# Patient Record
Sex: FEMALE | Race: AMERICAN INDIAN OR ALASKA NATIVE
[De-identification: names, ages, dates, MRNs, and addresses within clinical notes are randomized per-mention and may not be internally consistent; named-entity substitution may affect disease eponyms.]

---

## 2018-01-11 ENCOUNTER — HOSPITAL ENCOUNTER (INPATIENT)
Dept: HOSPITAL 74 - JLDR | Age: 38
LOS: 2 days | Discharge: HOME | DRG: 560 | End: 2018-01-13
Attending: OBSTETRICS & GYNECOLOGY | Admitting: OBSTETRICS & GYNECOLOGY
Payer: COMMERCIAL

## 2018-01-11 VITALS — BODY MASS INDEX: 27.1 KG/M2

## 2018-01-11 DIAGNOSIS — Z3A.38: ICD-10-CM

## 2018-01-11 LAB
ANION GAP SERPL CALC-SCNC: 12 MMOL/L (ref 8–16)
BASOPHILS # BLD: 0.5 % (ref 0–2)
BUN SERPL-MCNC: 10 MG/DL (ref 7–18)
CALCIUM SERPL-MCNC: 8.6 MG/DL (ref 8.5–10.1)
CHLORIDE SERPL-SCNC: 104 MMOL/L (ref 98–107)
CO2 SERPL-SCNC: 20 MMOL/L (ref 21–32)
CREAT SERPL-MCNC: 0.5 MG/DL (ref 0.55–1.02)
DEPRECATED RDW RBC AUTO: 14 % (ref 11.6–15.6)
EOSINOPHIL # BLD: 1.3 % (ref 0–4.5)
GLUCOSE SERPL-MCNC: 81 MG/DL (ref 74–106)
HCT VFR BLD CALC: 37.9 % (ref 32.4–45.2)
HGB BLD-MCNC: 12.1 GM/DL (ref 10.7–15.3)
INR BLD: 0.87 (ref 0.82–1.09)
LYMPHOCYTES # BLD: 20.1 % (ref 8–40)
MCH RBC QN AUTO: 25.9 PG (ref 25.7–33.7)
MCHC RBC AUTO-ENTMCNC: 32 G/DL (ref 32–36)
MCV RBC: 81.1 FL (ref 80–96)
MONOCYTES # BLD AUTO: 5.2 % (ref 3.8–10.2)
NEUTROPHILS # BLD: 72.9 % (ref 42.8–82.8)
PLATELET # BLD AUTO: 250 K/MM3 (ref 134–434)
PMV BLD: 9.9 FL (ref 7.5–11.1)
POTASSIUM SERPLBLD-SCNC: 4 MMOL/L (ref 3.5–5.1)
PT PNL PPP: 9.8 SEC (ref 9.98–11.88)
RBC # BLD AUTO: 4.68 M/MM3 (ref 3.6–5.2)
SODIUM SERPL-SCNC: 136 MMOL/L (ref 136–145)
WBC # BLD AUTO: 9.1 K/MM3 (ref 4–10)

## 2018-01-11 RX ADMIN — FERROUS SULFATE TAB EC 324 MG (65 MG FE EQUIVALENT) SCH: 324 (65 FE) TABLET DELAYED RESPONSE at 17:34

## 2018-01-11 NOTE — PN
Delivery





- Delivery


Vaginal Delivery: No Problems, Spontaneous


Type of Anesthesia: Local, Epidural


Episiotomy/Laceration: Midline (episiotomy sutured in layers with chr catgut #2/

0 under 1% lidocaine infiltration . pr exam mucosa & sphincter intact)


EBL (cc): 350





Delivery, Single Birth





- Stages of Labor


Date 1st Stage Initiatied: 18


Time 1st Stage Initiated: 11:30


Date 2nd Stage Initiated: 18


Time 2nd Stage Initiated: 16:00


Date of Delivery: 18


Time of Delivery: 16:22


Date Placenta Delivered: 18


Time Placenta Delivered: 16:25


Placenta: Yes: Spontaneous, Uterine Exploration





- Condition of Infant


Pediatrician/Neonatologist Present: No


Infant Gender: Male


Birth Weight: 7 lb 4 oz


Position: Left, OA


Total Hours ROM (Hrs/Mins): 11 hrs 55min





- Apgar


  ** 1 Minute


Apgar Total Score: 9





  ** 5 Minutes


Apgar Total Score: 9





-  Feeding Plan


Initial Plan: Elected not to breastfeed exclusively throughout hospitalization





Remarks





- Remarks


Remarks: 


37 yrs ( AMA), , 38.5 weeks SROM , admitted in labor 


PNC at [facility] 


GBS neg 


Pitocin augmentation was given . 


Intrapartum course was normal

## 2018-01-11 NOTE — PN
Progress Note, Labor


  ** Vaginal Exam #1


Labor Exam Date: 18


Labor Exam Time: 10:25


Fetal Heart Rate (range): 130


Dilatation: 3-4


Effacement (%): 65


Amniotic Membrane Status: Ruptured


Fetal Presentation: Vertex/Position


Fetal Station: -2 (-3/-2)


Remarks: 


FHR cat-1


UC q3-5 min irregular, mild 


Pitocin 1 ml/hr, augmentation started at 9.10 AM 


Patient admitted by Dr Shannon , labor management transferred to Ob on call 

Dr Nelson 


37 Yrs ( AMA) , 38.5/7 weeks, s/o SROM since 4.30 AM 1/10/18 .


[facility] . [facility] reviewed .


Genetic counselling & NIPT , Anatomy sono done at [facility] [place] .


Pap Ascus , Hpv neg, Gbs neg , PPD pos , h/o Anemia  in mid trimester, 

corrected by po Iron &  pnv 


 Selected Entries











  18





  08:00 09:00


 


Temperature 97.7 F 


 


Pulse Rate 74 72


 


Blood Pressure 120/77 114/80








 Laboratory Tests











  18





  06:50 06:50 06:50


 


WBC   9.1 


 


Hgb   12.1 


 


Hct   37.9 


 


Plt Count   250 


 


Neutrophils %   72.9 


 


Lymphocytes %   20.1 


 


PT with INR  9.80 L  


 


INR  0.87  


 


PTT (Actin FS)   


 


Sodium    136


 


Potassium    4.0


 


Chloride    104


 


Carbon Dioxide    20 L


 


BUN    10


 


Creatinine    0.5 L


 


Random Glucose    81


 


Blood Type   


 


Antibody Screen   














  18





  06:50 06:50 06:50


 


WBC   


 


Hgb   


 


Hct   


 


Plt Count   


 


Neutrophils %   


 


Lymphocytes %   


 


PT with INR   


 


INR   


 


PTT (Actin FS)   28.4 


 


Sodium   


 


Potassium   


 


Chloride   


 


Carbon Dioxide   


 


BUN   


 


Creatinine   


 


Random Glucose   


 


Blood Type    B POSITIVE


 


Antibody Screen  Negative  














  ** Vaginal Exam #2


Labor Exam Date: 18


Labor Exam Time: 12:45


Fetal Heart Rate (range): 130


Dilatation: 6


Effacement (%): 90


Amniotic Membrane Status: Ruptured


Fetal Presentation: Vertex/Position


Fetal Station: +1


Remarks: 


FHR cat-1


UC 3-4 min 


pitocin 4ml//hr 


epidural analgesia is given at 12.00 Noon 


Temp 98.7


 Selected Entries











  18





  12:30


 


Pulse Rate 97 H


 


Blood Pressure 115/77














  ** Vaginal Exam #3


Labor Exam Date: 18


Labor Exam Time: 13:45


Fetal Heart Rate (range): 120-130


Dilatation: 8


Effacement (%): 90


Amniotic Membrane Status: Ruptured


Fetal Presentation: Vertex/Position


Fetal Station: +2 (caput, ant lip edematous)


Remarks: 


fhr cat-1


uc 3-4 min 


pt c/o pressure pain 


epidural top off given 








  ** Vaginal Exam #4


Labor Exam Date: 18


Labor Exam Time: 16:00


Fetal Heart Rate (range): 120-110


Dilatation: 10


Effacement (%): 100


Amniotic Membrane Status: Ruptured


Fetal Station: +2 (+2/+3)


Remarks: 


FHR cat-2


uc 2-3 min 


pt pushing

## 2018-01-11 NOTE — HP
Past Medical History





- Admission


Chief Complaint: Rupture of membrane


History of Present Illness: 





38 yo  @ 38.5 weeks gestation, EDC 18 presents c/o rupture of 

membrane.


She c/o mild contractions pain. Upon admission she was 4cm dilated.


History Source: Patient


Limitations to Obtaining History: No Limitations





- Past Medical History


...: 2


...Para: 0


...Term: 0


...: 0


...Spon : 0


...Induced : 1


...Multiple Gestation: 0


...LMP: 17


...EDC by Sono: 18





- Past Surgical History


Past Surgical History: Yes: None


Hx Myomectomy: No


Hx Transabdominal Cerclage: No





- Smoking History


Smoking history: Never smoked


Have you smoked in the past 12 months: No





- Alcohol/Substance Use


Hx Alcohol Use: No


History of Substance Use: reports: None





- Social History


Usual Living Arrangement: Yes: With Spouse


History of Recent Travel: No





Home Medications





- Allergies


Allergies/Adverse Reactions: 


 Allergies











Allergy/AdvReac Type Severity Reaction Status Date / Time


 


No Known Drug Allergies Allergy   Verified 18 06:28














- Home Medications


Home Medications: 


Ambulatory Orders





Prenatal Vit/Iron Fum/Folic AC [Prenatal Tablet] 1 tab PO DAILY 18 











Family Disease History





- Family Disease History


Family History: Unremarkable





Review of Systems





- Review of Systems


Constitutional: reports: No Symptoms


Eyes: reports: No Symptoms


HENT: reports: No Symptoms


Neck: reports: No Symptoms


Cardiovascular: reports: No Symptoms


Respiratory: reports: No Symptoms


Gastrointestinal: reports: No Symptoms


Genitourinary: reports: No Symptoms, Other (Leakage of amniotic fluid)


Breasts: reports: No Symptoms Reported


Musculoskeletal: reports: No Symptoms


Integumentary: reports: No Symptoms


Neurological: reports: No Symptoms


Endocrine: reports: No Symptoms


Hematology/Lymphatic: reports: No Symptoms


Psychiatric: reports: No Symptoms


Pain Intensity: 3





Physical Exam - Maternity


Vital Signs: 


 Vital Signs











Temperature  97.7 F   18 08:00


 


Pulse Rate  74   18 08:00


 


Respiratory Rate  18   18 08:00


 


Blood Pressure  120/77   18 08:00


 


O2 Sat by Pulse Oximetry (%)      











Constitutional: Yes: Well Nourished


Eyes: Yes: Conjunctiva Clear


HENT: Yes: Atraumatic


Neck: Yes: Supple


Cardiovascular: Yes: Regular Rate and Rhythm


Lungs: Clear to auscultation


Breast(s): Yes: WNL





- Abdominal Exam/OB


Number of Fetuses: Single


Fetal Presentation: Vertex





- Vaginal Exam/OB


Dilatation (cm): 4


Effacement (%): 80


Amniotic Membrane Status: Ruptured


Amniotic Fluid: Yes: Clear


Meconium: Light


Fetal Station: -2





- Physical Exam


Musculoskeletal: Yes: WNL


Extremities: Yes: WNL


...Motor Strength: WNL


Psychiatric: Yes: Alert, Oriented





- Labs


Lab Results: 


 CBC, BMP





 18 06:50 











Problem List





- Problems


(1) Spontaneous rupture of membranes


Code(s): RED1940 -    





Assessment/Plan





IUP @ 38 weeks


SROM


Admit to L&D


Analgesia as needed


Pitocin augmentation

## 2018-01-12 VITALS — TEMPERATURE: 97.8 F

## 2018-01-12 LAB
BASOPHILS # BLD: 0.2 % (ref 0–2)
DEPRECATED RDW RBC AUTO: 13.5 % (ref 11.6–15.6)
EOSINOPHIL # BLD: 0.5 % (ref 0–4.5)
HCT VFR BLD CALC: 30.5 % (ref 32.4–45.2)
HGB BLD-MCNC: 9.7 GM/DL (ref 10.7–15.3)
LYMPHOCYTES # BLD: 13.3 % (ref 8–40)
MCH RBC QN AUTO: 25.8 PG (ref 25.7–33.7)
MCHC RBC AUTO-ENTMCNC: 31.9 G/DL (ref 32–36)
MCV RBC: 80.9 FL (ref 80–96)
MONOCYTES # BLD AUTO: 4.8 % (ref 3.8–10.2)
NEUTROPHILS # BLD: 81.2 % (ref 42.8–82.8)
PLATELET # BLD AUTO: 192 K/MM3 (ref 134–434)
PMV BLD: 9.5 FL (ref 7.5–11.1)
RBC # BLD AUTO: 3.77 M/MM3 (ref 3.6–5.2)
WBC # BLD AUTO: 12.6 K/MM3 (ref 4–10)

## 2018-01-12 RX ADMIN — FERROUS SULFATE TAB EC 324 MG (65 MG FE EQUIVALENT) SCH MG: 324 (65 FE) TABLET DELAYED RESPONSE at 07:43

## 2018-01-12 RX ADMIN — Medication SCH TAB: at 09:28

## 2018-01-12 RX ADMIN — FERROUS SULFATE TAB EC 324 MG (65 MG FE EQUIVALENT) SCH MG: 324 (65 FE) TABLET DELAYED RESPONSE at 17:15

## 2018-01-12 RX ADMIN — IBUPROFEN PRN MG: 600 TABLET, FILM COATED ORAL at 07:44

## 2018-01-12 NOTE — PN
Progress Note (short form)





- Note


Progress Note: 





ppd 1 doing well, no c/o , voids ok


 CBC, BMP





 01/12/18 06:45 





 01/11/18 06:50 





 Last Vital Signs











Temp Pulse Resp BP Pulse Ox


 


 98.6 F   88   20   95/59   98 


 


 01/12/18 06:00  01/12/18 06:00  01/12/18 06:00  01/12/18 06:00  01/11/18 16:00








abdomen soft, uterus firm, non tender 


lochia mild


no calf tenderness 


plan ambualte,, iron, vit

## 2018-01-13 VITALS — DIASTOLIC BLOOD PRESSURE: 66 MMHG | SYSTOLIC BLOOD PRESSURE: 98 MMHG | HEART RATE: 69 BPM

## 2018-01-13 RX ADMIN — IBUPROFEN PRN MG: 600 TABLET, FILM COATED ORAL at 01:33

## 2018-01-13 RX ADMIN — FERROUS SULFATE TAB EC 324 MG (65 MG FE EQUIVALENT) SCH MG: 324 (65 FE) TABLET DELAYED RESPONSE at 08:03

## 2018-01-13 RX ADMIN — Medication SCH TAB: at 09:55

## 2018-01-13 NOTE — PN
Progress Note (short form)





- Note


Progress Note: 





ppd 2 ,no c/o no excess vaginal bleeding , voids ok


 CBC, BMP





 01/12/18 06:45 





 01/11/18 06:50 





 Last Vital Signs











Temp Pulse Resp BP Pulse Ox


 


 97.8 F   73   18   103/70   98 


 


 01/12/18 22:00  01/12/18 22:00  01/12/18 22:00  01/12/18 22:00  01/11/18 16:00








uterus firm, non tender 


perineum clean 


no calf tenderness 


plan D?C home, rt 4 weeks

## 2018-01-16 NOTE — DS
Physical Exam-GYN


Vital Signs: 


 Vital Signs











Temperature  97.8 F   18 09:00


 


Pulse Rate  69   18 09:00


 


Respiratory Rate  20   18 09:00


 


Blood Pressure  98/66   18 09:00


 


O2 Sat by Pulse Oximetry (%)  98   18 16:00











Constitutional: Yes: Well Nourished, Pallor


HENT: Yes: WNL


Neck: Yes: WNL


Cardiovascular: Yes: WNL


Respiratory: Yes: WNL


Gastrointestinal: Yes: WNL


...Rectal Exam: Yes: WNL


Renal/: Yes: WNL


....Post Partum: Yes: Uterus firm, Uterus non-tender, Moderate lochia rubra (

episiotomy healing. perineal soreness felt)


Breast(s): Yes: WNL (bf)


Musculoskeletal: Yes: WNL


Extremities: Yes: WNL.  No: Calf Tenderness


Edema: Yes


Edema: LLE: 1+, RLE: 1+


Neurological: Yes: WNL, Alert, Oriented


...Motor Strength: WNL


Psychiatric: Yes: WNL


Labs: 


 CBC, BMP





 18 06:45 





 18 06:50 











Delivery





- Delivery


Vaginal Delivery: No Problems, Spontaneous


Type of Anesthesia: Local, Epidural


Episiotomy/Laceration: Midline (episiotomy sutured in layers with chr catgut #2/

0 under 1% lidocaine infiltration . pr exam mucosa & sphincter intact)


EBL (cc): 350





Delivery, Single Birth





- Stages of Labor


Date 1st Stage Initiatied: 18


Time 1st Stage Initiated: 11:30


Date 2nd Stage Initiated: 18


Time 2nd Stage Initiated: 16:00


Date of Delivery: 18


Time of Delivery: 16:22


Time Placenta Delivered: 16:25


Placenta: Yes: Spontaneous, Uterine Exploration





- Condition of Infant


Pediatrician/Neonatologist Present: No


Infant Gender: Male


Birth Weight: 7 lb 4 oz


Position: Left, OA


Total Hours ROM (Hrs/Mins): 11 hrs 55min





- Apgar


  ** 1 Minute


Apgar Total Score: 9





  ** 5 Minutes


Apgar Total Score: 9





- [place] Feeding Plan


Initial Plan: Elected not to breastfeed exclusively throughout hospitalization





Remarks





- Remarks


Remarks: 


37 yrs ( AMA), , 38.5 weeks SROM , admitted in labor 


PNC at [facility] 


GBS neg 


Pitocin augmentation was given . 


Intrapartum course was normal .


post partum course uneventful


anemia counselled


discharge on 2108








Discharge Summary


Reason For Visit: LABOR ADMIT


Condition: Good





- Instructions


Diet, Activity, Other Instructions: 


regular diet, no intercourse ,follow up [facility] care 4 weeks


Referrals: 


Daniela Nelson MD [Staff Physician] - 


Disposition: HOME





- Home Medications


Comprehensive Discharge Medication List: 


Ambulatory Orders





Prenatal Vit/Iron Fum/Folic AC [Prenatal Tablet] 1 tab PO DAILY 18 


Ibuprofen [Motrin -] 600 mg PO QID #28 tablet 18